# Patient Record
Sex: MALE | Race: WHITE | NOT HISPANIC OR LATINO | URBAN - NONMETROPOLITAN AREA
[De-identification: names, ages, dates, MRNs, and addresses within clinical notes are randomized per-mention and may not be internally consistent; named-entity substitution may affect disease eponyms.]

---

## 2017-06-21 PROBLEM — Z96.1: Noted: 2017-06-21

## 2017-06-21 PROBLEM — H26.493: Noted: 2018-03-14

## 2017-06-21 PROBLEM — H40.1131: Noted: 2017-06-21

## 2017-06-21 PROBLEM — H04.123: Noted: 2018-03-14

## 2019-03-14 ENCOUNTER — IMPORTED ENCOUNTER (OUTPATIENT)
Dept: URBAN - NONMETROPOLITAN AREA CLINIC 1 | Facility: CLINIC | Age: 74
End: 2019-03-14

## 2019-03-14 PROCEDURE — 92133 CPTRZD OPH DX IMG PST SGM ON: CPT

## 2019-03-14 PROCEDURE — 99212 OFFICE O/P EST SF 10 MIN: CPT

## 2019-03-14 NOTE — PATIENT DISCUSSION
COAG-appears stable good IOP 16:19 03/14/19-Continue Lumigan OU qhs-D/C Timolol 0.25% OU qod(3x's/wk) as directed. -START Combigan qd OU -Stressed importance of compliance. -Performed and reviewed OCT ONH stable -Continue to [address] [facility] IOL OU w/ PCO-stable monitorDES OU-stable monitor-continue Refresh OU-given sample of refresh repair-consider punctal plugs if this doesn't helpRTC 3 MO CEE; 's Notes: VF - 11/14/18

## 2019-06-18 ENCOUNTER — IMPORTED ENCOUNTER (OUTPATIENT)
Dept: URBAN - NONMETROPOLITAN AREA CLINIC 1 | Facility: CLINIC | Age: 74
End: 2019-06-18

## 2019-06-18 PROCEDURE — 99213 OFFICE O/P EST LOW 20 MIN: CPT

## 2019-06-18 NOTE — PATIENT DISCUSSION
COAG-appears stable good IOP 17:18 06/18/19-Continue Lumigan OU qhs-Increase Combigan bid OU -Stressed importance of compliance.-Order OCT ONH-Continue to monitorPC IOL OU w/ PCO-stable monitorDES OU-stable monitor-continue Refresh OU-given sample of refresh repair-consider punctal plugs if this doesn't helpRTC 4 MO IOP check OCT ONH; Dr's Notes: VF - 11/14/18

## 2019-10-31 ENCOUNTER — IMPORTED ENCOUNTER (OUTPATIENT)
Dept: URBAN - NONMETROPOLITAN AREA CLINIC 1 | Facility: CLINIC | Age: 74
End: 2019-10-31

## 2019-10-31 PROCEDURE — 92133 CPTRZD OPH DX IMG PST SGM ON: CPT

## 2019-10-31 PROCEDURE — 99212 OFFICE O/P EST SF 10 MIN: CPT

## 2019-10-31 NOTE — PATIENT DISCUSSION
COAG-appears stable good IOP 18:19 10/31/19-Continue Lumigan OU qhs-Cont Combigan bid OU -Stressed importance of compliance. -OCT ONH performed and reviewed w/pt-Continue to [address] [facility] IOL OU w/ PCO-stable monitorDES OU-stable monitor-continue Refresh OU-given sample of refresh repair-consider punctal plugs if this doesn't helpRTC 4 MO IOP check; 's Notes: VF - 11/14/18

## 2020-02-18 ENCOUNTER — IMPORTED ENCOUNTER (OUTPATIENT)
Dept: URBAN - NONMETROPOLITAN AREA CLINIC 1 | Facility: CLINIC | Age: 75
End: 2020-02-18

## 2020-02-18 PROCEDURE — 92012 INTRM OPH EXAM EST PATIENT: CPT

## 2020-02-18 NOTE — PATIENT DISCUSSION
COAG-appears stable good IOP 15 OU 02/18/20-Continue Lumigan OU qhs-Cont Combigan bid OU -Stressed importance of compliance.-Order VF -Continue to monitorPC IOL OU w/ PCO-stable monitorDES OU-stable monitor-continue Refresh OURTC 4 MO CEE VF; 's Notes: VF - 11/14/18

## 2020-07-28 ENCOUNTER — IMPORTED ENCOUNTER (OUTPATIENT)
Dept: URBAN - NONMETROPOLITAN AREA CLINIC 1 | Facility: CLINIC | Age: 75
End: 2020-07-28

## 2020-07-28 PROBLEM — H04.123: Noted: 2020-07-28

## 2020-07-28 PROBLEM — H16.293: Noted: 2020-07-28

## 2020-07-28 PROBLEM — Z96.1: Noted: 2020-07-28

## 2020-07-28 PROBLEM — H40.1131: Noted: 2020-07-28

## 2020-07-28 PROBLEM — H26.493: Noted: 2020-07-28

## 2020-07-28 PROCEDURE — 99213 OFFICE O/P EST LOW 20 MIN: CPT

## 2020-07-28 NOTE — PATIENT DISCUSSION
conjunctivitis START Tobradex qid OU X10 dayscontinue to monitorCOAG-appears stable-IOP 19:21 07/28/20-Continue Lumigan OU qhs-Cont Combigan bid OU -Stressed importance of compliance.-Order VF and OCT ONH-Continue to monitorPC IOL OU w/ PCO-stable monitorDES OU-stable monitor-continue Refresh OURTC N/A VF and OCT ONH; Dr's Notes: VF - 11/14/18

## 2020-08-04 ENCOUNTER — IMPORTED ENCOUNTER (OUTPATIENT)
Dept: URBAN - NONMETROPOLITAN AREA CLINIC 1 | Facility: CLINIC | Age: 75
End: 2020-08-04

## 2020-08-04 PROCEDURE — 92133 CPTRZD OPH DX IMG PST SGM ON: CPT

## 2020-08-04 PROCEDURE — 92083 EXTENDED VISUAL FIELD XM: CPT

## 2020-08-04 NOTE — PATIENT DISCUSSION
VF ONH   8/04/20conjunctivitis START Tobradex qid OU X10 dayscontinue to monitorCOAG-appears stable-IOP 19:21 07/28/20-Continue Lumigan OU qhs-Cont Combigan bid OU -Stressed importance of compliance.-Order VF and OCT ONH-Continue to monitorPC IOL OU w/ PCO-stable monitorDES OU-stable monitor-continue Refresh OURTC N/A VF and OCT ONH    8/4/20; 's Notes: VF - 11/14/18

## 2020-11-12 ENCOUNTER — IMPORTED ENCOUNTER (OUTPATIENT)
Dept: URBAN - NONMETROPOLITAN AREA CLINIC 1 | Facility: CLINIC | Age: 75
End: 2020-11-12

## 2020-11-12 PROBLEM — Z96.1: Noted: 2020-11-12

## 2020-11-12 PROBLEM — H04.123: Noted: 2020-11-12

## 2020-11-12 PROBLEM — H40.1131: Noted: 2020-11-12

## 2020-11-12 PROBLEM — H26.493: Noted: 2020-11-12

## 2020-11-12 PROCEDURE — 92012 INTRM OPH EXAM EST PATIENT: CPT

## 2020-11-12 NOTE — PATIENT DISCUSSION
COAG-appears stable-IOP 21:22 11/12/20-D/C due to irritation Lumigan OU qhs-D/C due to irritation Combigan bid OU -START Timolol qd OU START Travatan qd OU START Alphagan bid OU -Recommend pt use pataday for itching ******-Instructed pt if his ins co approves new drops he needs to be seen for IOP check 2 wk after starting new regimen*****-Stressed importance of compliance. -Reviewed VF and OCT ONH-Continue to monitorPC IOL OU w/ PCO-stable monitorDES OU-stable monitor-continue Refresh OURTC 3 Mo IOP check; 's Notes: VF - 11/14/18

## 2020-11-25 ENCOUNTER — IMPORTED ENCOUNTER (OUTPATIENT)
Dept: URBAN - NONMETROPOLITAN AREA CLINIC 1 | Facility: CLINIC | Age: 75
End: 2020-11-25

## 2020-11-25 PROCEDURE — 92012 INTRM OPH EXAM EST PATIENT: CPT

## 2020-11-25 NOTE — PATIENT DISCUSSION
COAG-appears stable-IOP improved to 18 OU.-D/C due to irritation Lumigan OU qhs-D/C due to irritation Combigan bid OU -Continue Timolol qd OU -Continue Travatan qd OU-Continue Alphagan bid OU Rx sent to pharmacy-Recommend pt use pataday for itching -Stressed importance of compliance.-Continue to monitor-RTC in 4 months for IOP check PC IOL OU w/ PCO-stable monitorDES OU-stable monitor-continue Refresh OU; 's Notes: VF - 11/14/18

## 2021-03-25 ENCOUNTER — IMPORTED ENCOUNTER (OUTPATIENT)
Dept: URBAN - NONMETROPOLITAN AREA CLINIC 1 | Facility: CLINIC | Age: 76
End: 2021-03-25

## 2021-03-25 PROCEDURE — 92012 INTRM OPH EXAM EST PATIENT: CPT

## 2021-03-25 NOTE — PATIENT DISCUSSION
COAG-appears stable-IOP 21 OU 03/25/21-Continue Timolol qd OU -Continue Travatan qd OU-Continue Alphagan bid OU Rx sent to pharmacy-Recommend pt use pataday for itching -Stressed importance of compliance.-Order OCT ONH -Continue to monitorPC IOL OU w/ PCO-stable monitorDES OU-stable monitor-continue Refresh OU; Dr's Notes: VF - 11/14/18-D/C due to irritation Lumigan OU qhs-D/C due to irritation Combigan bid OU

## 2021-04-20 ENCOUNTER — IMPORTED ENCOUNTER (OUTPATIENT)
Dept: URBAN - NONMETROPOLITAN AREA CLINIC 1 | Facility: CLINIC | Age: 76
End: 2021-04-20

## 2021-04-20 PROBLEM — H26.493: Noted: 2021-04-20

## 2021-04-20 PROBLEM — H52.4: Noted: 2021-04-20

## 2021-04-20 PROBLEM — Z96.1: Noted: 2021-04-20

## 2021-04-20 PROBLEM — H40.1131: Noted: 2021-04-20

## 2021-04-20 PROBLEM — H04.123: Noted: 2021-04-20

## 2021-04-20 PROCEDURE — 92015 DETERMINE REFRACTIVE STATE: CPT

## 2021-04-20 NOTE — PATIENT DISCUSSION
New Rx dispensed today COAG-appears stable-IOP 21 OU 03/25/21-Continue Timolol qd OU -Continue Travatan qd OU-Continue Alphagan bid OU Rx sent to pharmacy-Recommend pt use pataday for itching -Stressed importance of compliance.-Order OCT ONH -Continue to monitorPC IOL OU w/ PCO-stable monitorDES OU-stable monitor-continue Refresh OU; Dr's Notes: VF - 11/14/18-D/C due to irritation Lumigan OU qhs-D/C due to irritation Combigan bid OU

## 2021-07-22 ENCOUNTER — IMPORTED ENCOUNTER (OUTPATIENT)
Dept: URBAN - NONMETROPOLITAN AREA CLINIC 1 | Facility: CLINIC | Age: 76
End: 2021-07-22

## 2021-07-22 PROBLEM — H26.493: Noted: 2021-07-22

## 2021-07-22 PROBLEM — Z96.1: Noted: 2021-07-22

## 2021-07-22 PROBLEM — H16.223: Noted: 2021-07-22

## 2021-07-22 PROBLEM — H40.1131: Noted: 2021-07-22

## 2021-07-22 PROCEDURE — 92014 COMPRE OPH EXAM EST PT 1/>: CPT

## 2021-07-22 PROCEDURE — 92133 CPTRZD OPH DX IMG PST SGM ON: CPT

## 2021-07-22 NOTE — PATIENT DISCUSSION
COAG-appears stable-IOP 18 OU 07/22/21-Continue Travatan qd OU-Continue Alphagan bid OU-Continue Timolol qam OU -Stressed importance of compliance. -OCT ONH performed and reviewed w/pt -Order VF and Gonio-Continue to [address] [facility] IOL OU w/ PCO-stable monitorKsicca-stable monitor-Discussed plugs OU pt defers at this time -continue Refresh OU; Dr's Notes: VF - 11/14/18-D/C due to irritation Lumigan OU qhs-D/C due to irritation Combigan bid OU

## 2021-11-11 ENCOUNTER — IMPORTED ENCOUNTER (OUTPATIENT)
Dept: URBAN - NONMETROPOLITAN AREA CLINIC 1 | Facility: CLINIC | Age: 76
End: 2021-11-11

## 2021-11-11 PROCEDURE — 92083 EXTENDED VISUAL FIELD XM: CPT

## 2021-11-11 PROCEDURE — 92020 GONIOSCOPY: CPT

## 2021-11-11 PROCEDURE — 99213 OFFICE O/P EST LOW 20 MIN: CPT

## 2021-11-11 NOTE — PATIENT DISCUSSION
COAG-IOP 17/18 today-Discussed reducing drop burden; continue Travoprost QHS OU and Timolol QAM OU but change Alphagan to Rhopressa QHS OU-Did not tolerate LUMIGAN or COMBIGAN in the past-Stressed importance of compliance.-Order VF and Gonio today reviewed stable-Recheck IOP in 3 weeks. PC IOL OU w/ PCO-stable monitorKsicca-stable monitor-Discussed plugs OU pt defers at this time -not tolerating REFRESH ok to use other AT's; Dr's Notes: VF - 11/14/18-D/C due to irritation Lumigan OU qhs-D/C due to irritation Combigan bid OU

## 2021-12-02 ENCOUNTER — IMPORTED ENCOUNTER (OUTPATIENT)
Dept: URBAN - NONMETROPOLITAN AREA CLINIC 1 | Facility: CLINIC | Age: 76
End: 2021-12-02

## 2021-12-02 PROCEDURE — 99213 OFFICE O/P EST LOW 20 MIN: CPT

## 2021-12-02 NOTE — PATIENT DISCUSSION
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

## 2021-12-21 ENCOUNTER — PREPPED CHART (OUTPATIENT)
Dept: RURAL CLINIC 2 | Facility: CLINIC | Age: 76
End: 2021-12-21

## 2022-04-06 ASSESSMENT — TONOMETRY
OD_IOP_MMHG: 17
OS_IOP_MMHG: 18

## 2022-04-07 ENCOUNTER — ESTABLISHED PATIENT (OUTPATIENT)
Dept: RURAL CLINIC 2 | Facility: CLINIC | Age: 77
End: 2022-04-07

## 2022-04-07 DIAGNOSIS — H16.223: ICD-10-CM

## 2022-04-07 DIAGNOSIS — H26.493: ICD-10-CM

## 2022-04-07 DIAGNOSIS — Z96.1: ICD-10-CM

## 2022-04-07 DIAGNOSIS — H40.1131: ICD-10-CM

## 2022-04-07 DIAGNOSIS — Z98.890: ICD-10-CM

## 2022-04-07 PROCEDURE — 92133 CPTRZD OPH DX IMG PST SGM ON: CPT

## 2022-04-07 PROCEDURE — 99214 OFFICE O/P EST MOD 30 MIN: CPT

## 2022-04-07 PROCEDURE — 76514 ECHO EXAM OF EYE THICKNESS: CPT

## 2022-04-07 ASSESSMENT — TONOMETRY
OS_IOP_MMHG: 19
OD_IOP_MMHG: 16

## 2022-04-07 ASSESSMENT — VISUAL ACUITY
OD_CC: 20/20
OS_CC: 20/20

## 2022-04-07 NOTE — PATIENT DISCUSSION
The IOP is above the target range OS.  Doing well OD.  Continue all meds but increase ALPHAGAN to TID OS only.

## 2022-04-10 ASSESSMENT — VISUAL ACUITY
OS_SC: 20/20
OD_PH: 20/20
OS_SC: 20/20-1
OS_SC: 20/20
OD_SC: 20/20
OD_SC: 20/20
OD_SC: 20/30+3
OD_SC: 20/20-2
OS_SC: 20/25
OS_CC: 20/25
OS_SC: 20/20
OD_CC: J1+
OD_SC: 20/20
OS_SC: 20/20
OS_SC: 20/20
OS_SC: 20/30
OS_SC: 20/20
OD_SC: 20/20
OS_SC: 20/20
OD_SC: 20/30-2
OD_SC: 20/25-1
OS_SC: 20/20
OS_SC: 20/25
OD_CC: 20/25
OD_SC: 20/20
OS_CC: J1+
OD_SC: 20/20
OD_SC: 20/20
OD_SC: 20/20-1
OU_SC: 20/20-1

## 2022-04-10 ASSESSMENT — TONOMETRY
OD_IOP_MMHG: 16
OS_IOP_MMHG: 18
OD_IOP_MMHG: 21
OS_IOP_MMHG: 18
OS_IOP_MMHG: 15
OD_IOP_MMHG: 18
OS_IOP_MMHG: 19
OS_IOP_MMHG: 18
OD_IOP_MMHG: 19
OD_IOP_MMHG: 21
OS_IOP_MMHG: 21
OS_IOP_MMHG: 21
OD_IOP_MMHG: 17
OS_IOP_MMHG: 22
OS_IOP_MMHG: 18
OS_IOP_MMHG: 18
OD_IOP_MMHG: 18
OD_IOP_MMHG: 17
OD_IOP_MMHG: 15
OS_IOP_MMHG: 19
OD_IOP_MMHG: 17
OD_IOP_MMHG: 18

## 2022-07-18 ENCOUNTER — ESTABLISHED PATIENT (OUTPATIENT)
Dept: RURAL CLINIC 2 | Facility: CLINIC | Age: 77
End: 2022-07-18

## 2022-07-18 DIAGNOSIS — H26.493: ICD-10-CM

## 2022-07-18 DIAGNOSIS — H40.1131: ICD-10-CM

## 2022-07-18 DIAGNOSIS — Z98.890: ICD-10-CM

## 2022-07-18 DIAGNOSIS — H16.223: ICD-10-CM

## 2022-07-18 DIAGNOSIS — Z96.1: ICD-10-CM

## 2022-07-18 PROCEDURE — 99213 OFFICE O/P EST LOW 20 MIN: CPT

## 2022-07-18 ASSESSMENT — TONOMETRY
OS_IOP_MMHG: 16
OD_IOP_MMHG: 15

## 2022-07-18 ASSESSMENT — VISUAL ACUITY
OD_CC: 20/20
OS_CC: 20/20

## 2022-07-18 NOTE — PATIENT DISCUSSION
The IOP is in the target range. The patient will continue the current management: TIMOLOL QAM OU, TRAVOPROST QHS OU and ALPHAGAN BID OD, TID OS.

## 2022-11-21 ENCOUNTER — ESTABLISHED PATIENT (OUTPATIENT)
Dept: RURAL CLINIC 2 | Facility: CLINIC | Age: 77
End: 2022-11-21

## 2022-11-21 DIAGNOSIS — H40.1131: ICD-10-CM

## 2022-11-21 DIAGNOSIS — H26.493: ICD-10-CM

## 2022-11-21 DIAGNOSIS — H16.223: ICD-10-CM

## 2022-11-21 DIAGNOSIS — Z96.1: ICD-10-CM

## 2022-11-21 DIAGNOSIS — Z98.890: ICD-10-CM

## 2022-11-21 PROCEDURE — 99213 OFFICE O/P EST LOW 20 MIN: CPT

## 2022-11-21 PROCEDURE — 92083 EXTENDED VISUAL FIELD XM: CPT

## 2022-11-21 ASSESSMENT — TONOMETRY
OS_IOP_MMHG: 20
OD_IOP_MMHG: 20

## 2022-11-21 ASSESSMENT — VISUAL ACUITY
OS_CC: 20/20
OD_CC: 20/20

## 2022-11-21 NOTE — PATIENT DISCUSSION
The IOP is above the target range but has been out of Travoprost and Alphagan for a week.  Only had TIMOLOL BID OU.

## 2023-06-19 ENCOUNTER — ESTABLISHED PATIENT (OUTPATIENT)
Dept: RURAL CLINIC 2 | Facility: CLINIC | Age: 78
End: 2023-06-19

## 2023-06-19 DIAGNOSIS — H26.493: ICD-10-CM

## 2023-06-19 DIAGNOSIS — Z96.1: ICD-10-CM

## 2023-06-19 DIAGNOSIS — H40.1131: ICD-10-CM

## 2023-06-19 DIAGNOSIS — H16.143: ICD-10-CM

## 2023-06-19 DIAGNOSIS — Z98.890: ICD-10-CM

## 2023-06-19 DIAGNOSIS — H16.223: ICD-10-CM

## 2023-06-19 PROCEDURE — 99213 OFFICE O/P EST LOW 20 MIN: CPT

## 2023-06-19 ASSESSMENT — TONOMETRY
OS_IOP_MMHG: 16
OD_IOP_MMHG: 16

## 2023-06-19 ASSESSMENT — VISUAL ACUITY
OD_CC: 20/20
OS_CC: 20/20

## 2023-10-16 ENCOUNTER — ESTABLISHED PATIENT (OUTPATIENT)
Dept: RURAL CLINIC 2 | Facility: CLINIC | Age: 78
End: 2023-10-16

## 2023-10-16 DIAGNOSIS — H16.223: ICD-10-CM

## 2023-10-16 DIAGNOSIS — H16.143: ICD-10-CM

## 2023-10-16 DIAGNOSIS — H26.493: ICD-10-CM

## 2023-10-16 DIAGNOSIS — Z96.1: ICD-10-CM

## 2023-10-16 DIAGNOSIS — H40.1131: ICD-10-CM

## 2023-10-16 DIAGNOSIS — Z98.890: ICD-10-CM

## 2023-10-16 PROCEDURE — 99213 OFFICE O/P EST LOW 20 MIN: CPT

## 2023-10-16 PROCEDURE — 92083 EXTENDED VISUAL FIELD XM: CPT

## 2023-10-16 ASSESSMENT — VISUAL ACUITY
OS_CC: 20/20
OD_CC: 20/20

## 2023-10-16 ASSESSMENT — TONOMETRY
OD_IOP_MMHG: 15
OS_IOP_MMHG: 15

## 2024-04-09 ENCOUNTER — ESTABLISHED PATIENT (OUTPATIENT)
Dept: RURAL CLINIC 2 | Facility: CLINIC | Age: 79
End: 2024-04-09

## 2024-04-09 DIAGNOSIS — H16.223: ICD-10-CM

## 2024-04-09 DIAGNOSIS — H40.1131: ICD-10-CM

## 2024-04-09 DIAGNOSIS — H16.143: ICD-10-CM

## 2024-04-09 DIAGNOSIS — Z98.890: ICD-10-CM

## 2024-04-09 DIAGNOSIS — Z96.1: ICD-10-CM

## 2024-04-09 DIAGNOSIS — H26.493: ICD-10-CM

## 2024-04-09 PROCEDURE — 92133 CPTRZD OPH DX IMG PST SGM ON: CPT

## 2024-04-09 PROCEDURE — 92014 COMPRE OPH EXAM EST PT 1/>: CPT

## 2024-04-09 ASSESSMENT — TONOMETRY
OD_IOP_MMHG: 16
OS_IOP_MMHG: 18

## 2024-04-09 ASSESSMENT — VISUAL ACUITY
OD_CC: 20/20
OS_CC: 20/20

## 2024-08-13 ENCOUNTER — FOLLOW UP (OUTPATIENT)
Dept: RURAL CLINIC 2 | Facility: CLINIC | Age: 79
End: 2024-08-13

## 2024-08-13 DIAGNOSIS — H52.13: ICD-10-CM

## 2024-08-13 DIAGNOSIS — H16.143: ICD-10-CM

## 2024-08-13 DIAGNOSIS — H16.223: ICD-10-CM

## 2024-08-13 DIAGNOSIS — Z96.1: ICD-10-CM

## 2024-08-13 DIAGNOSIS — H52.4: ICD-10-CM

## 2024-08-13 DIAGNOSIS — H26.493: ICD-10-CM

## 2024-08-13 DIAGNOSIS — H40.1131: ICD-10-CM

## 2024-08-13 DIAGNOSIS — Z98.890: ICD-10-CM

## 2024-08-13 DIAGNOSIS — H52.223: ICD-10-CM

## 2024-08-13 PROCEDURE — 92012 INTRM OPH EXAM EST PATIENT: CPT

## 2024-08-13 PROCEDURE — 92015 DETERMINE REFRACTIVE STATE: CPT

## 2024-08-13 ASSESSMENT — TONOMETRY
OS_IOP_MMHG: 19
OD_IOP_MMHG: 17

## 2024-08-13 ASSESSMENT — VISUAL ACUITY
OD_CC: 20/20
OS_CC: 20/20

## 2024-10-10 ENCOUNTER — FOLLOW UP (OUTPATIENT)
Dept: RURAL CLINIC 2 | Facility: CLINIC | Age: 79
End: 2024-10-10

## 2024-10-10 DIAGNOSIS — Z98.890: ICD-10-CM

## 2024-10-10 DIAGNOSIS — H16.143: ICD-10-CM

## 2024-10-10 DIAGNOSIS — H40.1131: ICD-10-CM

## 2024-10-10 DIAGNOSIS — H26.493: ICD-10-CM

## 2024-10-10 DIAGNOSIS — Z96.1: ICD-10-CM

## 2024-10-10 DIAGNOSIS — H10.013: ICD-10-CM

## 2024-10-10 DIAGNOSIS — H16.223: ICD-10-CM

## 2024-10-10 PROCEDURE — 99214 OFFICE O/P EST MOD 30 MIN: CPT

## 2024-10-10 PROCEDURE — 92083 EXTENDED VISUAL FIELD XM: CPT

## 2024-11-20 ENCOUNTER — FOLLOW UP (OUTPATIENT)
Dept: RURAL CLINIC 2 | Facility: CLINIC | Age: 79
End: 2024-11-20

## 2024-11-20 DIAGNOSIS — H16.143: ICD-10-CM

## 2024-11-20 DIAGNOSIS — Z98.890: ICD-10-CM

## 2024-11-20 DIAGNOSIS — H10.013: ICD-10-CM

## 2024-11-20 DIAGNOSIS — Z96.1: ICD-10-CM

## 2024-11-20 DIAGNOSIS — H40.1131: ICD-10-CM

## 2024-11-20 DIAGNOSIS — H26.493: ICD-10-CM

## 2024-11-20 DIAGNOSIS — H16.223: ICD-10-CM

## 2024-11-20 PROCEDURE — 99213 OFFICE O/P EST LOW 20 MIN: CPT

## 2025-04-23 ENCOUNTER — FOLLOW UP (OUTPATIENT)
Age: 80
End: 2025-04-23

## 2025-04-23 DIAGNOSIS — H16.223: ICD-10-CM

## 2025-04-23 DIAGNOSIS — Z96.1: ICD-10-CM

## 2025-04-23 DIAGNOSIS — H16.143: ICD-10-CM

## 2025-04-23 DIAGNOSIS — H10.013: ICD-10-CM

## 2025-04-23 DIAGNOSIS — H40.1131: ICD-10-CM

## 2025-04-23 DIAGNOSIS — H26.493: ICD-10-CM

## 2025-04-23 DIAGNOSIS — Z98.890: ICD-10-CM

## 2025-04-23 PROCEDURE — 92014 COMPRE OPH EXAM EST PT 1/>: CPT

## 2025-04-23 PROCEDURE — 92133 CPTRZD OPH DX IMG PST SGM ON: CPT
